# Patient Record
Sex: MALE | Race: WHITE | NOT HISPANIC OR LATINO | Employment: FULL TIME | URBAN - METROPOLITAN AREA
[De-identification: names, ages, dates, MRNs, and addresses within clinical notes are randomized per-mention and may not be internally consistent; named-entity substitution may affect disease eponyms.]

---

## 2023-11-07 DIAGNOSIS — I10 HYPERTENSION, UNSPECIFIED TYPE: ICD-10-CM

## 2023-11-07 RX ORDER — LISINOPRIL 10 MG/1
10 TABLET ORAL DAILY
COMMUNITY
End: 2023-11-07 | Stop reason: SDUPTHER

## 2023-11-10 RX ORDER — LISINOPRIL 10 MG/1
10 TABLET ORAL DAILY
Qty: 90 TABLET | Refills: 1 | Status: SHIPPED | OUTPATIENT
Start: 2023-11-10 | End: 2024-05-02

## 2024-04-16 ENCOUNTER — OFFICE VISIT (OUTPATIENT)
Dept: PRIMARY CARE | Facility: CLINIC | Age: 59
End: 2024-04-16
Payer: COMMERCIAL

## 2024-04-16 VITALS
WEIGHT: 175 LBS | BODY MASS INDEX: 21.76 KG/M2 | HEART RATE: 71 BPM | HEIGHT: 75 IN | OXYGEN SATURATION: 99 % | RESPIRATION RATE: 20 BRPM | SYSTOLIC BLOOD PRESSURE: 128 MMHG | DIASTOLIC BLOOD PRESSURE: 74 MMHG | TEMPERATURE: 98.8 F

## 2024-04-16 DIAGNOSIS — L63.9 ALOPECIA AREATA: ICD-10-CM

## 2024-04-16 DIAGNOSIS — Z00.00 ROUTINE GENERAL MEDICAL EXAMINATION AT A HEALTH CARE FACILITY: Primary | ICD-10-CM

## 2024-04-16 DIAGNOSIS — Z12.5 SCREENING FOR MALIGNANT NEOPLASM OF PROSTATE: ICD-10-CM

## 2024-04-16 LAB
ALBUMIN SERPL-MCNC: 4.6 G/DL (ref 3.5–5)
ALP BLD-CCNC: 69 U/L (ref 35–125)
ALT SERPL-CCNC: 27 U/L (ref 5–40)
ANION GAP SERPL CALC-SCNC: 12 MMOL/L
AST SERPL-CCNC: 26 U/L (ref 5–40)
BASOPHILS # BLD AUTO: 0.02 X10*3/UL (ref 0–0.1)
BASOPHILS NFR BLD AUTO: 0.4 %
BILIRUB SERPL-MCNC: 0.7 MG/DL (ref 0.1–1.2)
BUN SERPL-MCNC: 19 MG/DL (ref 8–25)
CALCIUM SERPL-MCNC: 9.8 MG/DL (ref 8.5–10.4)
CHLORIDE SERPL-SCNC: 101 MMOL/L (ref 97–107)
CHOLEST SERPL-MCNC: 181 MG/DL (ref 133–200)
CHOLEST/HDLC SERPL: 4.9 {RATIO}
CO2 SERPL-SCNC: 26 MMOL/L (ref 24–31)
CREAT SERPL-MCNC: 0.9 MG/DL (ref 0.4–1.6)
EGFRCR SERPLBLD CKD-EPI 2021: >90 ML/MIN/1.73M*2
EOSINOPHIL # BLD AUTO: 0.04 X10*3/UL (ref 0–0.7)
EOSINOPHIL NFR BLD AUTO: 0.8 %
ERYTHROCYTE [DISTWIDTH] IN BLOOD BY AUTOMATED COUNT: 13 % (ref 11.5–14.5)
ERYTHROCYTE [SEDIMENTATION RATE] IN BLOOD BY WESTERGREN METHOD: 8 MM/H (ref 0–20)
GLUCOSE SERPL-MCNC: 97 MG/DL (ref 65–99)
HCT VFR BLD AUTO: 44.6 % (ref 41–52)
HDLC SERPL-MCNC: 37 MG/DL
HGB BLD-MCNC: 15.3 G/DL (ref 13.5–17.5)
IMM GRANULOCYTES # BLD AUTO: 0.01 X10*3/UL (ref 0–0.7)
IMM GRANULOCYTES NFR BLD AUTO: 0.2 % (ref 0–0.9)
LDLC SERPL CALC-MCNC: 98 MG/DL (ref 65–130)
LYMPHOCYTES # BLD AUTO: 2.08 X10*3/UL (ref 1.2–4.8)
LYMPHOCYTES NFR BLD AUTO: 43 %
MCH RBC QN AUTO: 31.3 PG (ref 26–34)
MCHC RBC AUTO-ENTMCNC: 34.3 G/DL (ref 32–36)
MCV RBC AUTO: 91 FL (ref 80–100)
MONOCYTES # BLD AUTO: 0.41 X10*3/UL (ref 0.1–1)
MONOCYTES NFR BLD AUTO: 8.5 %
NEUTROPHILS # BLD AUTO: 2.28 X10*3/UL (ref 1.2–7.7)
NEUTROPHILS NFR BLD AUTO: 47.1 %
NRBC BLD-RTO: 0 /100 WBCS (ref 0–0)
PLATELET # BLD AUTO: 165 X10*3/UL (ref 150–450)
POTASSIUM SERPL-SCNC: 4.6 MMOL/L (ref 3.4–5.1)
PROT SERPL-MCNC: 7.3 G/DL (ref 5.9–7.9)
PSA SERPL-MCNC: 0.2 NG/ML
RBC # BLD AUTO: 4.89 X10*6/UL (ref 4.5–5.9)
SODIUM SERPL-SCNC: 139 MMOL/L (ref 133–145)
TRIGL SERPL-MCNC: 229 MG/DL (ref 40–150)
TSH SERPL DL<=0.05 MIU/L-ACNC: 1.35 MIU/L (ref 0.27–4.2)
WBC # BLD AUTO: 4.8 X10*3/UL (ref 4.4–11.3)

## 2024-04-16 PROCEDURE — 85652 RBC SED RATE AUTOMATED: CPT | Performed by: FAMILY MEDICINE

## 2024-04-16 PROCEDURE — 84153 ASSAY OF PSA TOTAL: CPT | Performed by: FAMILY MEDICINE

## 2024-04-16 PROCEDURE — 1036F TOBACCO NON-USER: CPT | Performed by: FAMILY MEDICINE

## 2024-04-16 PROCEDURE — 85025 COMPLETE CBC W/AUTO DIFF WBC: CPT | Performed by: FAMILY MEDICINE

## 2024-04-16 PROCEDURE — 36415 COLL VENOUS BLD VENIPUNCTURE: CPT | Performed by: FAMILY MEDICINE

## 2024-04-16 PROCEDURE — 80053 COMPREHEN METABOLIC PANEL: CPT | Performed by: FAMILY MEDICINE

## 2024-04-16 PROCEDURE — 86038 ANTINUCLEAR ANTIBODIES: CPT | Mod: WESLAB | Performed by: FAMILY MEDICINE

## 2024-04-16 PROCEDURE — 84443 ASSAY THYROID STIM HORMONE: CPT | Performed by: FAMILY MEDICINE

## 2024-04-16 PROCEDURE — 80061 LIPID PANEL: CPT | Performed by: FAMILY MEDICINE

## 2024-04-16 PROCEDURE — 99396 PREV VISIT EST AGE 40-64: CPT | Performed by: FAMILY MEDICINE

## 2024-04-16 ASSESSMENT — ENCOUNTER SYMPTOMS
GASTROINTESTINAL NEGATIVE: 1
DEPRESSION: 0
OCCASIONAL FEELINGS OF UNSTEADINESS: 0
LOSS OF SENSATION IN FEET: 0
MUSCULOSKELETAL NEGATIVE: 1
RESPIRATORY NEGATIVE: 1
CARDIOVASCULAR NEGATIVE: 1
NEUROLOGICAL NEGATIVE: 1
CONSTITUTIONAL NEGATIVE: 1

## 2024-04-16 ASSESSMENT — PAIN SCALES - GENERAL: PAINLEVEL: 0-NO PAIN

## 2024-04-16 ASSESSMENT — PATIENT HEALTH QUESTIONNAIRE - PHQ9
2. FEELING DOWN, DEPRESSED OR HOPELESS: NOT AT ALL
1. LITTLE INTEREST OR PLEASURE IN DOING THINGS: NOT AT ALL
SUM OF ALL RESPONSES TO PHQ9 QUESTIONS 1 AND 2: 0

## 2024-04-16 NOTE — PROGRESS NOTES
"Subjective   Patient ID: Mike Hung is a 58 y.o. male who presents for Annual Exam (HERE FOR A PHYSICAL AND FASTING BLOOD WORK. HAS A FEW BALD SPOTS ON HIS HEAD HE IS CONCERNED ABOUT. HE ALSO NEEDS REFILL ON HIS MEDICATION).    HPI 0 coronary calcium score.  Colonoscopy is up to date.     Review of Systems   Constitutional: Negative.    HENT: Negative.     Respiratory: Negative.     Cardiovascular: Negative.    Gastrointestinal: Negative.    Genitourinary: Negative.    Musculoskeletal: Negative.    Neurological: Negative.        Objective   /74 (BP Location: Right arm, Patient Position: Sitting, BP Cuff Size: Adult)   Pulse 71   Temp 37.1 °C (98.8 °F) (Skin)   Resp 20   Ht 1.905 m (6' 3\")   Wt 79.4 kg (175 lb)   SpO2 99%   BMI 21.87 kg/m²     Physical Exam  Vitals and nursing note reviewed.   Constitutional:       General: He is not in acute distress.  HENT:      Right Ear: Tympanic membrane and ear canal normal.      Left Ear: Tympanic membrane and ear canal normal.      Nose: Nose normal. No rhinorrhea.      Mouth/Throat:      Pharynx: Oropharynx is clear. No oropharyngeal exudate or posterior oropharyngeal erythema.      Comments: Dentition wnl  Eyes:      Extraocular Movements: Extraocular movements intact.      Conjunctiva/sclera: Conjunctivae normal.      Pupils: Pupils are equal, round, and reactive to light.   Neck:      Vascular: No carotid bruit.   Cardiovascular:      Rate and Rhythm: Normal rate and regular rhythm.      Heart sounds: Normal heart sounds. No murmur heard.  Pulmonary:      Breath sounds: Normal breath sounds. No wheezing or rhonchi.   Abdominal:      General: Bowel sounds are normal. There is no distension.      Palpations: Abdomen is soft. There is no mass.      Tenderness: There is no abdominal tenderness. There is no guarding or rebound.      Hernia: No hernia is present.   Musculoskeletal:         General: No swelling or tenderness. Normal range of motion.      " Cervical back: Normal range of motion and neck supple.   Lymphadenopathy:      Cervical: No cervical adenopathy.   Skin:     General: Skin is warm.      Findings: No rash.   Neurological:      General: No focal deficit present.      Mental Status: He is alert.         Assessment/Plan   Problem List Items Addressed This Visit    None  Visit Diagnoses         Codes    Routine general medical examination at a health care facility    -  Primary Z00.00    Relevant Orders    CBC and Auto Differential    Comprehensive Metabolic Panel    TSH with reflex to Free T4 if abnormal    Lipid Panel    Screening for malignant neoplasm of prostate     Z12.5    Relevant Orders    Prostate Specific Antigen    Alopecia areata     L63.9    Relevant Orders    PRITI without Reflex ATA    Sedimentation Rate          Call 6 months if not resolving and will set up with derm.

## 2024-04-17 LAB — ANA SER QL HEP2 SUBST: NEGATIVE

## 2024-05-02 DIAGNOSIS — I10 HYPERTENSION, UNSPECIFIED TYPE: Primary | ICD-10-CM

## 2024-05-02 RX ORDER — LISINOPRIL 10 MG/1
10 TABLET ORAL DAILY
Qty: 90 TABLET | Refills: 0 | Status: SHIPPED | OUTPATIENT
Start: 2024-05-02

## 2024-07-31 ENCOUNTER — APPOINTMENT (OUTPATIENT)
Dept: CARDIOLOGY | Facility: HOSPITAL | Age: 59
End: 2024-07-31
Payer: COMMERCIAL

## 2024-07-31 ENCOUNTER — APPOINTMENT (OUTPATIENT)
Dept: RADIOLOGY | Facility: HOSPITAL | Age: 59
End: 2024-07-31
Payer: COMMERCIAL

## 2024-07-31 ENCOUNTER — HOSPITAL ENCOUNTER (OUTPATIENT)
Facility: HOSPITAL | Age: 59
Setting detail: OBSERVATION
LOS: 1 days | Discharge: HOME | End: 2024-08-01
Attending: EMERGENCY MEDICINE | Admitting: STUDENT IN AN ORGANIZED HEALTH CARE EDUCATION/TRAINING PROGRAM
Payer: COMMERCIAL

## 2024-07-31 ENCOUNTER — PHARMACY VISIT (OUTPATIENT)
Dept: PHARMACY | Facility: CLINIC | Age: 59
End: 2024-07-31

## 2024-07-31 DIAGNOSIS — I10 PRIMARY HYPERTENSION: ICD-10-CM

## 2024-07-31 DIAGNOSIS — G45.9 TIA (TRANSIENT ISCHEMIC ATTACK): Primary | ICD-10-CM

## 2024-07-31 PROBLEM — E78.5 HLD (HYPERLIPIDEMIA): Status: ACTIVE | Noted: 2024-07-31

## 2024-07-31 LAB
ALBUMIN SERPL-MCNC: 4.4 G/DL (ref 3.5–5)
ALP BLD-CCNC: 63 U/L (ref 35–125)
ALT SERPL-CCNC: 30 U/L (ref 5–40)
ANION GAP SERPL CALC-SCNC: 9 MMOL/L
APTT PPP: 29.9 SECONDS (ref 22–32.5)
AST SERPL-CCNC: 28 U/L (ref 5–40)
BASOPHILS # BLD AUTO: 0.02 X10*3/UL (ref 0–0.1)
BASOPHILS NFR BLD AUTO: 0.4 %
BILIRUB SERPL-MCNC: 0.4 MG/DL (ref 0.1–1.2)
BUN SERPL-MCNC: 22 MG/DL (ref 8–25)
CALCIUM SERPL-MCNC: 9.4 MG/DL (ref 8.5–10.4)
CHLORIDE SERPL-SCNC: 102 MMOL/L (ref 97–107)
CO2 SERPL-SCNC: 28 MMOL/L (ref 24–31)
CREAT SERPL-MCNC: 1 MG/DL (ref 0.4–1.6)
EGFRCR SERPLBLD CKD-EPI 2021: 87 ML/MIN/1.73M*2
EOSINOPHIL # BLD AUTO: 0.06 X10*3/UL (ref 0–0.7)
EOSINOPHIL NFR BLD AUTO: 1.1 %
ERYTHROCYTE [DISTWIDTH] IN BLOOD BY AUTOMATED COUNT: 12.4 % (ref 11.5–14.5)
GLUCOSE BLD MANUAL STRIP-MCNC: 86 MG/DL (ref 74–99)
GLUCOSE SERPL-MCNC: 105 MG/DL (ref 65–99)
HCT VFR BLD AUTO: 42.3 % (ref 41–52)
HGB BLD-MCNC: 14.9 G/DL (ref 13.5–17.5)
IMM GRANULOCYTES # BLD AUTO: 0.01 X10*3/UL (ref 0–0.7)
IMM GRANULOCYTES NFR BLD AUTO: 0.2 % (ref 0–0.9)
INR PPP: 1.1 (ref 0.9–1.2)
LYMPHOCYTES # BLD AUTO: 2.41 X10*3/UL (ref 1.2–4.8)
LYMPHOCYTES NFR BLD AUTO: 45.6 %
MCH RBC QN AUTO: 31.4 PG (ref 26–34)
MCHC RBC AUTO-ENTMCNC: 35.2 G/DL (ref 32–36)
MCV RBC AUTO: 89 FL (ref 80–100)
MONOCYTES # BLD AUTO: 0.39 X10*3/UL (ref 0.1–1)
MONOCYTES NFR BLD AUTO: 7.4 %
NEUTROPHILS # BLD AUTO: 2.4 X10*3/UL (ref 1.2–7.7)
NEUTROPHILS NFR BLD AUTO: 45.3 %
NRBC BLD-RTO: 0 /100 WBCS (ref 0–0)
PLATELET # BLD AUTO: 153 X10*3/UL (ref 150–450)
POTASSIUM SERPL-SCNC: 4.4 MMOL/L (ref 3.4–5.1)
PROT SERPL-MCNC: 7 G/DL (ref 5.9–7.9)
PROTHROMBIN TIME: 11.2 SECONDS (ref 9.3–12.7)
RBC # BLD AUTO: 4.74 X10*6/UL (ref 4.5–5.9)
SODIUM SERPL-SCNC: 139 MMOL/L (ref 133–145)
TROPONIN T SERPL-MCNC: 8 NG/L
WBC # BLD AUTO: 5.3 X10*3/UL (ref 4.4–11.3)

## 2024-07-31 PROCEDURE — RXMED WILLOW AMBULATORY MEDICATION CHARGE

## 2024-07-31 PROCEDURE — G0378 HOSPITAL OBSERVATION PER HR: HCPCS

## 2024-07-31 PROCEDURE — 99285 EMERGENCY DEPT VISIT HI MDM: CPT | Mod: 25

## 2024-07-31 PROCEDURE — 70450 CT HEAD/BRAIN W/O DYE: CPT | Performed by: RADIOLOGY

## 2024-07-31 PROCEDURE — 70551 MRI BRAIN STEM W/O DYE: CPT

## 2024-07-31 PROCEDURE — 85730 THROMBOPLASTIN TIME PARTIAL: CPT | Performed by: EMERGENCY MEDICINE

## 2024-07-31 PROCEDURE — 2500000001 HC RX 250 WO HCPCS SELF ADMINISTERED DRUGS (ALT 637 FOR MEDICARE OP): Performed by: STUDENT IN AN ORGANIZED HEALTH CARE EDUCATION/TRAINING PROGRAM

## 2024-07-31 PROCEDURE — 70450 CT HEAD/BRAIN W/O DYE: CPT

## 2024-07-31 PROCEDURE — 80053 COMPREHEN METABOLIC PANEL: CPT | Performed by: EMERGENCY MEDICINE

## 2024-07-31 PROCEDURE — 82947 ASSAY GLUCOSE BLOOD QUANT: CPT

## 2024-07-31 PROCEDURE — 93010 ELECTROCARDIOGRAM REPORT: CPT | Performed by: INTERNAL MEDICINE

## 2024-07-31 PROCEDURE — 85025 COMPLETE CBC W/AUTO DIFF WBC: CPT | Performed by: EMERGENCY MEDICINE

## 2024-07-31 PROCEDURE — 70551 MRI BRAIN STEM W/O DYE: CPT | Performed by: RADIOLOGY

## 2024-07-31 PROCEDURE — 85610 PROTHROMBIN TIME: CPT | Performed by: EMERGENCY MEDICINE

## 2024-07-31 PROCEDURE — 93005 ELECTROCARDIOGRAM TRACING: CPT

## 2024-07-31 PROCEDURE — 70544 MR ANGIOGRAPHY HEAD W/O DYE: CPT

## 2024-07-31 PROCEDURE — 84484 ASSAY OF TROPONIN QUANT: CPT | Performed by: EMERGENCY MEDICINE

## 2024-07-31 PROCEDURE — 36415 COLL VENOUS BLD VENIPUNCTURE: CPT | Performed by: EMERGENCY MEDICINE

## 2024-07-31 PROCEDURE — 2500000001 HC RX 250 WO HCPCS SELF ADMINISTERED DRUGS (ALT 637 FOR MEDICARE OP): Performed by: INTERNAL MEDICINE

## 2024-07-31 PROCEDURE — 94760 N-INVAS EAR/PLS OXIMETRY 1: CPT

## 2024-07-31 RX ORDER — LISINOPRIL 20 MG/1
20 TABLET ORAL ONCE
Status: DISCONTINUED | OUTPATIENT
Start: 2024-07-31 | End: 2024-07-31

## 2024-07-31 RX ORDER — LISINOPRIL 10 MG/1
10 TABLET ORAL ONCE
Status: COMPLETED | OUTPATIENT
Start: 2024-07-31 | End: 2024-07-31

## 2024-07-31 RX ORDER — POLYETHYLENE GLYCOL 3350 17 G/17G
17 POWDER, FOR SOLUTION ORAL DAILY
Status: DISCONTINUED | OUTPATIENT
Start: 2024-07-31 | End: 2024-08-01 | Stop reason: HOSPADM

## 2024-07-31 RX ORDER — LISINOPRIL 20 MG/1
20 TABLET ORAL DAILY
Status: DISCONTINUED | OUTPATIENT
Start: 2024-08-01 | End: 2024-08-01 | Stop reason: HOSPADM

## 2024-07-31 RX ORDER — LISINOPRIL 10 MG/1
10 TABLET ORAL DAILY
Status: DISCONTINUED | OUTPATIENT
Start: 2024-07-31 | End: 2024-07-31

## 2024-07-31 RX ORDER — LISINOPRIL 20 MG/1
20 TABLET ORAL DAILY
Qty: 30 TABLET | Refills: 1 | Status: SHIPPED | OUTPATIENT
Start: 2024-08-01 | End: 2024-08-31

## 2024-07-31 SDOH — SOCIAL STABILITY: SOCIAL INSECURITY: HAS ANYONE EVER THREATENED TO HURT YOUR FAMILY OR YOUR PETS?: NO

## 2024-07-31 SDOH — SOCIAL STABILITY: SOCIAL INSECURITY: DO YOU FEEL ANYONE HAS EXPLOITED OR TAKEN ADVANTAGE OF YOU FINANCIALLY OR OF YOUR PERSONAL PROPERTY?: NO

## 2024-07-31 SDOH — SOCIAL STABILITY: SOCIAL INSECURITY: ABUSE: ADULT

## 2024-07-31 SDOH — SOCIAL STABILITY: SOCIAL INSECURITY: WERE YOU ABLE TO COMPLETE ALL THE BEHAVIORAL HEALTH SCREENINGS?: YES

## 2024-07-31 SDOH — SOCIAL STABILITY: SOCIAL INSECURITY: DOES ANYONE TRY TO KEEP YOU FROM HAVING/CONTACTING OTHER FRIENDS OR DOING THINGS OUTSIDE YOUR HOME?: NO

## 2024-07-31 SDOH — SOCIAL STABILITY: SOCIAL INSECURITY: ARE THERE ANY APPARENT SIGNS OF INJURIES/BEHAVIORS THAT COULD BE RELATED TO ABUSE/NEGLECT?: NO

## 2024-07-31 SDOH — SOCIAL STABILITY: SOCIAL INSECURITY: HAVE YOU HAD ANY THOUGHTS OF HARMING ANYONE ELSE?: NO

## 2024-07-31 SDOH — SOCIAL STABILITY: SOCIAL INSECURITY: DO YOU FEEL UNSAFE GOING BACK TO THE PLACE WHERE YOU ARE LIVING?: NO

## 2024-07-31 SDOH — SOCIAL STABILITY: SOCIAL INSECURITY: HAVE YOU HAD THOUGHTS OF HARMING ANYONE ELSE?: NO

## 2024-07-31 SDOH — SOCIAL STABILITY: SOCIAL INSECURITY: ARE YOU OR HAVE YOU BEEN THREATENED OR ABUSED PHYSICALLY, EMOTIONALLY, OR SEXUALLY BY ANYONE?: NO

## 2024-07-31 ASSESSMENT — ACTIVITIES OF DAILY LIVING (ADL)
ADEQUATE_TO_COMPLETE_ADL: YES
JUDGMENT_ADEQUATE_SAFELY_COMPLETE_DAILY_ACTIVITIES: YES
FEEDING YOURSELF: INDEPENDENT
HEARING - LEFT EAR: FUNCTIONAL
TOILETING: INDEPENDENT
DRESSING YOURSELF: INDEPENDENT
BATHING: INDEPENDENT
HEARING - RIGHT EAR: FUNCTIONAL
WALKS IN HOME: INDEPENDENT
GROOMING: INDEPENDENT
LACK_OF_TRANSPORTATION: NO
PATIENT'S MEMORY ADEQUATE TO SAFELY COMPLETE DAILY ACTIVITIES?: YES

## 2024-07-31 ASSESSMENT — COGNITIVE AND FUNCTIONAL STATUS - GENERAL
PATIENT BASELINE BEDBOUND: NO
MOBILITY SCORE: 24
DAILY ACTIVITIY SCORE: 24

## 2024-07-31 ASSESSMENT — COLUMBIA-SUICIDE SEVERITY RATING SCALE - C-SSRS
6. HAVE YOU EVER DONE ANYTHING, STARTED TO DO ANYTHING, OR PREPARED TO DO ANYTHING TO END YOUR LIFE?: NO
1. IN THE PAST MONTH, HAVE YOU WISHED YOU WERE DEAD OR WISHED YOU COULD GO TO SLEEP AND NOT WAKE UP?: NO
2. HAVE YOU ACTUALLY HAD ANY THOUGHTS OF KILLING YOURSELF?: NO

## 2024-07-31 ASSESSMENT — LIFESTYLE VARIABLES
AUDIT-C TOTAL SCORE: 2
AUDIT-C TOTAL SCORE: 2
HOW OFTEN DO YOU HAVE A DRINK CONTAINING ALCOHOL: 2-4 TIMES A MONTH
HOW MANY STANDARD DRINKS CONTAINING ALCOHOL DO YOU HAVE ON A TYPICAL DAY: 1 OR 2
SKIP TO QUESTIONS 9-10: 1
HOW OFTEN DO YOU HAVE 6 OR MORE DRINKS ON ONE OCCASION: NEVER

## 2024-07-31 ASSESSMENT — PAIN SCALES - GENERAL
PAINLEVEL_OUTOF10: 0 - NO PAIN

## 2024-07-31 ASSESSMENT — PATIENT HEALTH QUESTIONNAIRE - PHQ9
SUM OF ALL RESPONSES TO PHQ9 QUESTIONS 1 & 2: 0
1. LITTLE INTEREST OR PLEASURE IN DOING THINGS: NOT AT ALL
2. FEELING DOWN, DEPRESSED OR HOPELESS: NOT AT ALL

## 2024-07-31 ASSESSMENT — PAIN - FUNCTIONAL ASSESSMENT: PAIN_FUNCTIONAL_ASSESSMENT: 0-10

## 2024-07-31 NOTE — CONSULTS
Inpatient consult to Neurology  Consult performed by: Eliseo Kelley MD  Consult ordered by: Evert Oswald MD      Chief complaint: Transient episode of right leg weakness with right-sided numbness to rule out TIA    History Of Present Illness  iMke Hung is a 59 y.o. male with no significant past medical history except for hypertension and in good health came into the ED after patient felt that his right lower extremity went numb and weak.  The symptoms progressed to his right arm with numbness which lasted approximately 15 minutes.  However, patient googled his symptoms and informed his daughter who brought him to the ED for further evaluation management.  Since admission, patient has been doing well and was admitted to the hospital for further evaluation and management.  Patient denies any dizziness, nausea, vomiting, visual or speech disturbances associated with the episode.     Past Medical History  He has a past medical history of Hypertension, Personal history of other diseases of the respiratory system (10/14/2016), and Sebaceous cyst (02/21/2014).    Surgical History  He has no past surgical history on file.     Social History  He reports that he has never smoked. He has never been exposed to tobacco smoke. He has never used smokeless tobacco. He reports that he does not drink alcohol and does not use drugs.     Family history: No significant family history is noted    Allergies  Patient has no known allergies.    Medications  Medications Prior to Admission   Medication Sig Dispense Refill Last Dose    lisinopril 10 mg tablet TAKE ONE TABLET BY MOUTH EVERY DAY 90 tablet 0        Review of Systems  14 point review of systems was reviewed and negative except as noted above.    Neurological Exam  Physical Exam  Mental Status :  Alert , O x 3  Attention and concentration normal  Remote and recent memory intact    Speech : Clear , fluent  No aphasia or dysarthria noted    CN 2-12 :  Pupils round ,  "regular reacting to light  Fundus could not be assessed  VA intact, EOM intact  Facial sensation intact  No facial asymmetry noted  Hearing acuity intact bilaterally  Tongue midline  Shoulder shrug intact    Motor:  Strength moves all extremities against gravity  No pronator drift  Normal bulk and tone    Sensory:   Intact to light touch    Reflexes : 1+  symmetric with equivocal toes    Coordination : Intact to Finger to Nose    Gait : Unable to assess.      Last Recorded Vitals   Blood pressure 136/89, pulse 72, temperature 36 °C (96.8 °F), temperature source Temporal, resp. rate 17, height 1.905 m (6' 3\"), weight 83.1 kg (183 lb 3.2 oz), SpO2 96%.    Relevant Results  ECG 12 lead    Result Date: 7/31/2024  Normal sinus rhythm Normal ECG No previous ECGs available    MR brain wo IV contrast    Result Date: 7/31/2024  Interpreted By:  Tomy Lo, STUDY: MR BRAIN WO IV CONTRAST; MR ANGIO HEAD WO IV CONTRAST; ;  7/31/2024 8:23 am; 7/31/2024 8:31 am   INDICATION: Signs/Symptoms:TIA; Signs/Symptoms:A.   COMPARISON: CT head from 07/31/2024.   ACCESSION NUMBER(S): [id number]; [id number]   ORDERING CLINICIAN: WOODY PENALOZA   TECHNIQUE: MRI of the brain was performed with the acquisition of axial diffusion-weighted, axial FLAIR, axial T2 fat saturated, axial T2 gradient echo, and coronal and sagittal T1 weighted sequences.   MRA of the head was performed with the acquisition of axial 3D time-of-flight sequence. Segmented MIPs are provided.   FINDINGS: MRI brain:   There is no acute intracranial hemorrhage or infarct. There is no abnormal extra-axial fluid collection.   The ventricles, sulci, and basilar cisterns are normal in size, shape, and configuration. There are few scattered foci of T2 hyperintensity within the bilateral cerebral hemispheres which are nonspecific and of doubtful clinical significance.   Visualized paranasal sinuses and mastoid air cells are essentially clear.   MRA head:   Bilateral internal " carotid arteries are normal in course and caliber. There is no narrowing of the visualized portions of the bilateral anterior or middle cerebral arteries. There is no narrowing of the bilateral intradural vertebral arteries or basilar artery and there is no narrowing of the visualized portions of the bilateral posterior cerebral arteries.   There are no aneurysms.       Essentially unremarkable MRI of the brain and MRA of the head.   This study was interpreted at [facility].   MACRO: None   Signed by: Tomy Lo 7/31/2024 8:41 AM Dictation workstation:   [id number]    MR angio head wo IV contrast    Result Date: 7/31/2024  Interpreted By:  Tomy Lo, STUDY: MR BRAIN WO IV CONTRAST; MR ANGIO HEAD WO IV CONTRAST; ;  7/31/2024 8:23 am; 7/31/2024 8:31 am   INDICATION: Signs/Symptoms:TIA; Signs/Symptoms:A.   COMPARISON: CT head from 07/31/2024.   ACCESSION NUMBER(S): [id number]; [id number]   ORDERING CLINICIAN: WOODY PENALOZA   TECHNIQUE: MRI of the brain was performed with the acquisition of axial diffusion-weighted, axial FLAIR, axial T2 fat saturated, axial T2 gradient echo, and coronal and sagittal T1 weighted sequences.   MRA of the head was performed with the acquisition of axial 3D time-of-flight sequence. Segmented MIPs are provided.   FINDINGS: MRI brain:   There is no acute intracranial hemorrhage or infarct. There is no abnormal extra-axial fluid collection.   The ventricles, sulci, and basilar cisterns are normal in size, shape, and configuration. There are few scattered foci of T2 hyperintensity within the bilateral cerebral hemispheres which are nonspecific and of doubtful clinical significance.   Visualized paranasal sinuses and mastoid air cells are essentially clear.   MRA head:   Bilateral internal carotid arteries are normal in course and caliber. There is no narrowing of the visualized portions of the bilateral anterior or middle cerebral arteries. There is  no narrowing of the bilateral intradural vertebral arteries or basilar artery and there is no narrowing of the visualized portions of the bilateral posterior cerebral arteries.   There are no aneurysms.       Essentially unremarkable MRI of the brain and MRA of the head.   This study was interpreted at [facility].   MACRO: None   Signed by: Tomy Lo 7/31/2024 8:41 AM Dictation workstation:   [id number]    CT head wo IV contrast    Result Date: 7/31/2024  Interpreted By:  Álvaro Mortensen, STUDY: CT HEAD WO IV CONTRAST;  7/31/2024 3:58 am   INDICATION: Signs/Symptoms:RLE weakness, R sided numbness.   COMPARISON: None   ACCESSION NUMBER(S): [id number]   ORDERING CLINICIAN: ETHAN ARENAS   TECHNIQUE: Contiguous axial images of the head were obtained without intravenous contrast.   FINDINGS: BRAIN PARENCHYMA:   The gray white matter differentiation is preserved.  No mass effect or midline shift.   HEMORRHAGE:  No evidence of acute intracranial hemorrhage. VENTRICLES AND EXTRA-AXIAL SPACES:  The ventricles are within normal limits in size for brain volume.  No evidence of abnormal extraaxial fluid collection. EXTRACRANIAL SOFT TISSUES:  Within normal limits. PARANASAL SINUSES/MASTOIDS:  The visualized paranasal sinuses and mastoid air cells are clear and well pneumatized. CALVARIUM:  No evidence of depressed calvarial fracture.   OTHER FINDINGS:  None       No evidence of acute intracranial hemorrhage or mass effect.   If there is persistent concern for acute intracranial process, MRI may be obtained for further evaluation as clinically indicated.   MACRO: None   Signed by: Álvaro Mortensen 7/31/2024 4:05 AM Dictation workstation:   [id number]     Results for orders placed or performed during the hospital encounter of 07/31/24 (from the past 24 hour(s))   POCT GLUCOSE   Result Value Ref Range    POCT Glucose 86 74 - 99 mg/dL   CBC and Auto Differential   Result Value Ref Range    WBC  5.3 4.4 - 11.3 x10*3/uL    nRBC 0.0 0.0 - 0.0 /100 WBCs    RBC 4.74 4.50 - 5.90 x10*6/uL    Hemoglobin 14.9 13.5 - 17.5 g/dL    Hematocrit 42.3 41.0 - 52.0 %    MCV 89 80 - 100 fL    MCH 31.4 26.0 - 34.0 pg    MCHC 35.2 32.0 - 36.0 g/dL    RDW 12.4 11.5 - 14.5 %    Platelets 153 150 - 450 x10*3/uL    Neutrophils % 45.3 40.0 - 80.0 %    Immature Granulocytes %, Automated 0.2 0.0 - 0.9 %    Lymphocytes % 45.6 13.0 - 44.0 %    Monocytes % 7.4 2.0 - 10.0 %    Eosinophils % 1.1 0.0 - 6.0 %    Basophils % 0.4 0.0 - 2.0 %    Neutrophils Absolute 2.40 1.20 - 7.70 x10*3/uL    Immature Granulocytes Absolute, Automated 0.01 0.00 - 0.70 x10*3/uL    Lymphocytes Absolute 2.41 1.20 - 4.80 x10*3/uL    Monocytes Absolute 0.39 0.10 - 1.00 x10*3/uL    Eosinophils Absolute 0.06 0.00 - 0.70 x10*3/uL    Basophils Absolute 0.02 0.00 - 0.10 x10*3/uL   Comprehensive metabolic panel   Result Value Ref Range    Glucose 105 (H) 65 - 99 mg/dL    Sodium 139 133 - 145 mmol/L    Potassium 4.4 3.4 - 5.1 mmol/L    Chloride 102 97 - 107 mmol/L    Bicarbonate 28 24 - 31 mmol/L    Urea Nitrogen 22 8 - 25 mg/dL    Creatinine 1.00 0.40 - 1.60 mg/dL    eGFR 87 >60 mL/min/1.73m*2    Calcium 9.4 8.5 - 10.4 mg/dL    Albumin 4.4 3.5 - 5.0 g/dL    Alkaline Phosphatase 63 35 - 125 U/L    Total Protein 7.0 5.9 - 7.9 g/dL    AST 28 5 - 40 U/L    Bilirubin, Total 0.4 0.1 - 1.2 mg/dL    ALT 30 5 - 40 U/L    Anion Gap 9 <=19 mmol/L   Troponin T, High Sensitivity   Result Value Ref Range    Troponin T, High Sensitivity 8 <=14 ng/L   Protime-INR   Result Value Ref Range    Protime 11.2 9.3 - 12.7 seconds    INR 1.1 0.9 - 1.2   APTT   Result Value Ref Range    aPTT 29.9 22.0 - 32.5 seconds   ECG 12 lead   Result Value Ref Range    Ventricular Rate 61 BPM    Atrial Rate 61 BPM    ID Interval 168 ms    QRS Duration 94 ms    QT Interval 400 ms    QTC Calculation(Bazett) 402 ms    P Axis 30 degrees    R Axis -19 degrees    T Axis 22 degrees    QRS Count 10 beats    Q Onset  210 ms    P Onset 126 ms    P Offset 176 ms    T Offset 410 ms    QTC Fredericia 402 ms          Assessment/Plan     Active Problems:    Hypertension    HLD (hyperlipidemia)    Mike Hung is a 59 y.o. male with no significant past medical history except for hypertension and in good health came into the ED after patient felt that his right lower extremity went numb and weak and with right upper extremity numbness with symptoms resolved with MRI of the brain negative for any acute CVA, TIA would be in the differential given his risk factor, stable    Recommendations;  Reviewed MRI Brain and CT scan brain and  MR angiogram head /neck results.  Start Aspirin  and High intensity statin  2 d echo pending  PT/OT eval and treatment  Neurochecks  Stroke labs including TSH, Hb A1c, Lipid Panel  Permissive Blood pressure as per stroke protocol  Blood pressure / Blood sugar monitoring and control  Telemetry monitoring   Continue current management  Discussed the above plan with the patient / family / attending physician

## 2024-07-31 NOTE — CARE PLAN
The patient's goals for the shift include  safety and rest    The clinical goals for the shift include monitor stroke symptoms, safety, and promote rest      07/31/24 at 7:28 PM - Kailey Feliz RN

## 2024-07-31 NOTE — PROGRESS NOTES
Physical Therapy                 Therapy Communication Note    Patient Name: Mike Hung  MRN: [id number]  Today's Date: 7/31/2024     Discipline: Physical Therapy    Missed Visit Reason:      Missed Time: Cancel    Comment: Spoke w/ pt, he reports that his symptoms have resolved, feels back to baseline and has been able to get up and walk w/o difficulty. Pt denied need for PT services at this time. Will discontinue PT services.

## 2024-07-31 NOTE — DISCHARGE SUMMARY
Discharge Diagnosis  TIA (transient ischemic attack)    Issues Requiring Follow-Up  Primary care follow-up    Discharge Meds     Your medication list        CHANGE how you take these medications        Instructions Last Dose Given Next Dose Due   lisinopril 20 mg tablet  Start taking on: August 1, 2024  What changed:   medication strength  how much to take      Take 1 tablet (20 mg) by mouth once daily. Do not fill before August 1, 2024.                 Where to Get Your Medications        These medications were sent to [facility]  [address], [address], [place] [facility] [address]      Hours: 9 AM to 6 PM Mon-Fri, 9 AM to 1 PM Sat Phone: [phone]   lisinopril 20 mg tablet         Test Results Pending At Discharge  Pending Labs       No current pending labs.            Hospital Course   Patient mid to the hospital with right-sided weakness and tingling.  Patient symptoms improved when he got to the emergency room or shortly prior to.  In any case CT scan was done which was unremarkable, stroke consult was placed due to patient's NIH score with symptoms prior to ED.  Patient was admitted for further workup for TIA.  MRI/MRA were complete and unremarkable.  Neurology consult pending at this time.  Anticipate clearance from neurology.  Patient did not want to stay for his 2D echocardiogram and I have placed an order for this to be done as an outpatient.  Patient's blood pressure was elevated on admission to ED and has been elevated throughout the day and from history may be somewhat not ideally controlled at home.  I did increase his lisinopril to 20 mg in hospital.  Patient was discharged home with DASH diet instructions and heart healthy diet, and information on controlling blood pressure through lifestyle.    Patient be discharged home in stable condition and improved condition.      Pertinent Physical Exam At Time of Discharge  Physical Exam  Generally no acute distress  HEENT PERRL  EOMI  Cardiovascular [place] S2 regular rhythm  Lungs clear to auscultation bilaterally  Abdomen bowel sounds present  Extremities no clubbing cyanosis edema  Neuro alert and oriented x 3, cranial nerves II through XII intact, motor and sensation intact in both upper lower extremity  Outpatient Follow-Up  No future appointments.  Total time spent on discharge 45 minutes    Richard Ang MD

## 2024-07-31 NOTE — H&P
History Of Present Illness  Mike Hung is a 59 y.o. male with no significant past medical history except for continue home meds controlled with lisinopril presented to the ED complaining of right upper and lower extremity weakness after he woke up sometime around 2:30 in the morning.  He reports this is knee being weak and numbness and tingling in his right shoulder.  He did report he had difficulty walking, he took over-the-counter aspirin unknown dose, and presented to the ED.  Upon interviewing the patient around 5:30 AM, he reports 90% symptom improvement.  He continues to complain of some knee pain with clicking which she has been meaning to get outpatient workup.  He did report reproducing pain when he touched his right shoulder and pressed it with his left hand.  Patient is a lifetime non-smoker, denies history of stroke or seizure, facial droop, dysarthria, denies loss of bowel or bladder function, tongue biting, headache, unilateral vision loss, diplopia, headache, chest pain, palpitations, shortness of breath, or abdominal pain.    In the ED on admission NIH stroke scale 0, vitals notable for /104, has not taken his a.m. meds, labs unremarkable, CT imaging of head unremarkable.    Patient was admitted to observation for possible TIA stroke rule out    Past Medical History  Past Medical History:   Diagnosis Date    Hypertension     Personal history of other diseases of the respiratory system 10/14/2016    History of acute sinusitis    Sebaceous cyst 02/21/2014    Infected sebaceous cyst       Surgical History  History reviewed. No pertinent surgical history.     Social History  He reports that he has never smoked. He has never been exposed to tobacco smoke. He has never used smokeless tobacco. He reports that he does not drink alcohol and does not use drugs.    Family History  No family history on file.     Allergies  Patient has no known allergies.    Review of Systems  Denies history of stroke  or seizure, facial droop, dysarthria, denies loss of bowel or bladder function, tongue biting, headache, unilateral vision loss, diplopia, headache, chest pain, palpitations, shortness of breath, or abdominal pain.  Physical Exam  Constitutional:       General: He is not in acute distress.     Appearance: Normal appearance. He is normal weight. He is not ill-appearing or toxic-appearing.   HENT:      Head: Normocephalic and atraumatic.      Mouth/Throat:      Mouth: Mucous membranes are moist.   Eyes:      Extraocular Movements: Extraocular movements intact.      Pupils: Pupils are equal, round, and reactive to light.   Cardiovascular:      Rate and Rhythm: Normal rate and regular rhythm.      Heart sounds: No murmur heard.     No gallop.   Pulmonary:      Effort: Pulmonary effort is normal. No respiratory distress.      Breath sounds: Normal breath sounds. No wheezing or rales.   Abdominal:      General: Abdomen is flat. There is no distension.      Palpations: Abdomen is soft.      Tenderness: There is no abdominal tenderness. There is no guarding.   Musculoskeletal:         General: No swelling, tenderness, deformity or signs of injury. Normal range of motion.      Cervical back: No rigidity.      Right lower leg: No edema.      Left lower leg: No edema.   Skin:     General: Skin is warm and dry.      Coloration: Skin is not jaundiced.      Findings: No bruising.   Neurological:      General: No focal deficit present.      Mental Status: He is alert and oriented to person, place, and time.      Cranial Nerves: No cranial nerve deficit.      Sensory: No sensory deficit.      Motor: No weakness.      Coordination: Coordination normal.      Gait: Gait normal.      Comments: NIH stroke scale score 0   Psychiatric:         Mood and Affect: Mood normal.         Behavior: Behavior normal.         Thought Content: Thought content normal.         Judgment: Judgment normal.          Last Recorded Vitals  Blood pressure  "152/90, pulse 62, temperature 36.9 °C (98.4 °F), temperature source Temporal, resp. rate 16, height 1.905 m (6' 3\"), weight 81.6 kg (179 lb 14.3 oz), SpO2 98%.    Relevant Results      Results for orders placed or performed during the hospital encounter of 07/31/24 (from the past 24 hour(s))   POCT GLUCOSE   Result Value Ref Range    POCT Glucose 86 74 - 99 mg/dL   CBC and Auto Differential   Result Value Ref Range    WBC 5.3 4.4 - 11.3 x10*3/uL    nRBC 0.0 0.0 - 0.0 /100 WBCs    RBC 4.74 4.50 - 5.90 x10*6/uL    Hemoglobin 14.9 13.5 - 17.5 g/dL    Hematocrit 42.3 41.0 - 52.0 %    MCV 89 80 - 100 fL    MCH 31.4 26.0 - 34.0 pg    MCHC 35.2 32.0 - 36.0 g/dL    RDW 12.4 11.5 - 14.5 %    Platelets 153 150 - 450 x10*3/uL    Neutrophils % 45.3 40.0 - 80.0 %    Immature Granulocytes %, Automated 0.2 0.0 - 0.9 %    Lymphocytes % 45.6 13.0 - 44.0 %    Monocytes % 7.4 2.0 - 10.0 %    Eosinophils % 1.1 0.0 - 6.0 %    Basophils % 0.4 0.0 - 2.0 %    Neutrophils Absolute 2.40 1.20 - 7.70 x10*3/uL    Immature Granulocytes Absolute, Automated 0.01 0.00 - 0.70 x10*3/uL    Lymphocytes Absolute 2.41 1.20 - 4.80 x10*3/uL    Monocytes Absolute 0.39 0.10 - 1.00 x10*3/uL    Eosinophils Absolute 0.06 0.00 - 0.70 x10*3/uL    Basophils Absolute 0.02 0.00 - 0.10 x10*3/uL   Comprehensive metabolic panel   Result Value Ref Range    Glucose 105 (H) 65 - 99 mg/dL    Sodium 139 133 - 145 mmol/L    Potassium 4.4 3.4 - 5.1 mmol/L    Chloride 102 97 - 107 mmol/L    Bicarbonate 28 24 - 31 mmol/L    Urea Nitrogen 22 8 - 25 mg/dL    Creatinine 1.00 0.40 - 1.60 mg/dL    eGFR 87 >60 mL/min/1.73m*2    Calcium 9.4 8.5 - 10.4 mg/dL    Albumin 4.4 3.5 - 5.0 g/dL    Alkaline Phosphatase 63 35 - 125 U/L    Total Protein 7.0 5.9 - 7.9 g/dL    AST 28 5 - 40 U/L    Bilirubin, Total 0.4 0.1 - 1.2 mg/dL    ALT 30 5 - 40 U/L    Anion Gap 9 <=19 mmol/L   Troponin T, High Sensitivity   Result Value Ref Range    Troponin T, High Sensitivity 8 <=14 ng/L   Protime-INR "   Result Value Ref Range    Protime 11.2 9.3 - 12.7 seconds    INR 1.1 0.9 - 1.2   APTT   Result Value Ref Range    aPTT 29.9 22.0 - 32.5 seconds     CT head wo IV contrast    Result Date: 7/31/2024  Interpreted By:  Álvaro Mortensen, STUDY: CT HEAD WO IV CONTRAST;  7/31/2024 3:58 am   INDICATION: Signs/Symptoms:RLE weakness, R sided numbness.   COMPARISON: None   ACCESSION NUMBER(S): [id number]   ORDERING CLINICIAN: ETHAN ARENAS   TECHNIQUE: Contiguous axial images of the head were obtained without intravenous contrast.   FINDINGS: BRAIN PARENCHYMA:   The gray white matter differentiation is preserved.  No mass effect or midline shift.   HEMORRHAGE:  No evidence of acute intracranial hemorrhage. VENTRICLES AND EXTRA-AXIAL SPACES:  The ventricles are within normal limits in size for brain volume.  No evidence of abnormal extraaxial fluid collection. EXTRACRANIAL SOFT TISSUES:  Within normal limits. PARANASAL SINUSES/MASTOIDS:  The visualized paranasal sinuses and mastoid air cells are clear and well pneumatized. CALVARIUM:  No evidence of depressed calvarial fracture.   OTHER FINDINGS:  None       No evidence of acute intracranial hemorrhage or mass effect.   If there is persistent concern for acute intracranial process, MRI may be obtained for further evaluation as clinically indicated.   MACRO: None   Signed by: Álvaro Mortensen 7/31/2024 4:05 AM Dictation workstation:   [id number]        Assessment/Plan   Principal Problem:    TIA (transient ischemic attack)  Active Problems:    Hypertension    HLD (hyperlipidemia)    Transient ischemic attack  CT head nonacute  Recent lipid profile with slight hypertriglyceridemia  Troponins 8  Neurochecks  Fall precaution  PT OT consulted  MRI, MRA ordered and pending  Neurology consult    Hypertension:  Continue lisinopril    DVT prophylaxis; low risk  GI prophylaxis: PPI    CODE STATUS full code    I spent 45 minutes in the professional and overall care of this patient.    Evert  MD Darek

## 2024-07-31 NOTE — PROGRESS NOTES
07/31/24 1021   Discharge Planning   Living Arrangements Spouse/significant other   Support Systems Spouse/significant other   Assistance Needed independent   Type of Residence Private residence   Do you have animals or pets at home? No   Who is requesting discharge planning? Provider   Home or Post Acute Services None   Expected Discharge Disposition Home   Does the patient need discharge transport arranged? No     ADOD:  today   Home no needs

## 2024-07-31 NOTE — PROGRESS NOTES
Spiritual Care Visit    Clinical Encounter Type  Visited With: Patient  Routine Visit: Introduction  Continue Visiting: Yes         Values/Beliefs  Spiritual Requests During Hospitalization: Anointing & Communion today    Sacramental Encounters  Communion: Patient wants communion  Communion Given Indicator: Yes  Sacrament of Sick-Anointing: Anointed     Harsh Valentin

## 2024-07-31 NOTE — CARE PLAN
The patient's goals for the shift include  safety     The clinical goals for the shift include MRI brain, stroke role out      07/31/24 at 6:37 AM - Kailey Feliz RN

## 2024-07-31 NOTE — ED PROVIDER NOTES
EMERGENCY DEPARTMENT ENCOUNTER      Pt Name: Mike Hung  MRN: [id number]  Birthdate 1965  Date of evaluation: 7/31/2024  ED Provider: Juany Lopez DO     CHIEF COMPLAINT       Chief Complaint   Patient presents with    Extremity Weakness    Numbness     Pt woke up this morning to use the restroom and found that his right side was weak. Pt was unable to walk due to weakness in right leg. Pt regained some strength at home. Pt upper extremity strength is equal, but pt right leg is weaker than his left.       HISTORY OF PRESENT ILLNESS    Mike Hung is a 59 y.o. who presents to the emergency department via private vehicle with complaints of right lower extremity numbness and weakness as well as numbness in her right upper extremity.  He went to bed around 11 PM.  He woke up at approximately 230 and felt his right side was weak.  He did not really feel any speech difficulties and he looked in the mirror and did not notice any facial droop.  He does however state he he had difficulty walking and had to hold onto things.  He felt as though his leg was going to give out and he had difficulty placing weight.  Symptoms began to spontaneously subside.  He has never had anything like this previously.  He has no lightheadedness or dizziness.  No other acute complaints.    REVIEW OF SYSTEMS     Focused ROS performed and negative other than as listed in HPI    PAST MEDICAL HISTORY     Past Medical History:   Diagnosis Date    Hypertension     Personal history of other diseases of the respiratory system 10/14/2016    History of acute sinusitis    Sebaceous cyst 02/21/2014    Infected sebaceous cyst       SURGICAL HISTORY     History reviewed. No pertinent surgical history.    CURRENT MEDICATIONS       Previous Medications    LISINOPRIL 10 MG TABLET    TAKE ONE TABLET BY MOUTH EVERY DAY       ALLERGIES     Patient has no known allergies.    FAMILY HISTORY     No family history on file.     SOCIAL HISTORY       Social  History     Socioeconomic History    Marital status:    Tobacco Use    Smoking status: Never     Passive exposure: Never    Smokeless tobacco: Never   Vaping Use    Vaping status: Never Used   Substance and Sexual Activity    Alcohol use: Never    Drug use: Never    Sexual activity: Defer       PHYSICAL EXAM       ED Triage Vitals [07/31/24 0305]   Temperature Heart Rate Respirations BP   36.9 °C (98.4 °F) 63 16 (!) 175/104      Pulse Ox Temp Source Heart Rate Source Patient Position   99 % Temporal Monitor Sitting      BP Location FiO2 (%)     Right arm --        General: Appears well, no acute distress, alert  Head: Head atraumatic; normocephalic  Eyes: normal inspection; no icterus  ENT: mucosa moist without lesion  Neck: Normal inspection, no meningeal signs  Resp: Normal breath sounds, no wheeze or crackles; No respiratory distress  Chest Wall: no tenderness or deformity  Heart: Heart rate and rhythm regular; No Murmurs  Abdomen: Soft, Non-tender; No distention, guarding, rigidity, or rebound  MSK: Normal appearance; Moves all extremities; No Pedal edema  Neuro: AxOx3; CNII-XII intact; sensation intact to upper and lower extremities bilaterally; motor intact to upper and lower extremities bilaterally; finger-nose test normal; no nystagmus; normal gait; speech normal, no dysphasia or aphasia  Psych: Mood and Affect normal  Skin: Color appropriate; warm; Dry    DIAGNOSTIC RESULTS   Lab and radiology results are independently interpreted unless noted below.  RADIOLOGY (Per Emergency Physician):     Interpretation per the Radiologist below, if available at the time of this note:  CT head wo IV contrast   Final Result   No evidence of acute intracranial hemorrhage or mass effect.        If there is persistent concern for acute intracranial process, MRI   may be obtained for further evaluation as clinically indicated.        MACRO:   None        Signed by: Álvaro Mortensen 7/31/2024 4:05 AM   Dictation  workstation:   [id number]          LABS:  Abnormal Labs Reviewed   COMPREHENSIVE METABOLIC PANEL - Abnormal; Notable for the following components:       Result Value    Glucose 105 (*)     All other components within normal limits       All other labs were within normal range or not returned as of this dictation.    EKG:  Personally interpreted by Juany Lopez DO  0334: Normal sinus rhythm with ventricular rate 61 normal axis and intervals no acute ischemic changes    EMERGENCY DEPARTMENT COURSE/MDM   Patient presents with concern for strokelike symptoms.  Symptoms had spontaneously subsided by the time he arrived to the emergency department.  NIH is currently 0.  Stroke workup is initiated.  He is agreeable with this plan of care.    NIH Stroke Scale: 0      ED Course as of 07/31/24 0512   Wed Jul 31, 2024   0419 Lab work and imaging returned showing no acute abnormalities.  However given the clinical concern for possible CVA patient is recommended for admission.  He is updated on these findings and is agreeable with plan of admission. [EF]      ED Course User Index  [EF] Juany Lopez DO         Diagnoses as of 07/31/24 0512   TIA (transient ischemic attack)       Meds Administered:  Medications - No data to display    PROCEDURES   Unless otherwise noted below, none  Procedures      FINAL IMPRESSION      1. TIA (transient ischemic attack)          DISPOSITION    Admit 07/31/2024 04:31:09 AM        (Comment: Please note this report has been produced using speech recognition software and may contain errors related to that system including errors in grammar, punctuation, and spelling, as well as words and phrases that may be inappropriate.  If there are any questions or concerns please feel free to contact the dictating provider for clarification.)    Juany Lopez DO (electronically signed)  Emergency Medicine Physician    History Limited by: None  Independent history obtained from: None  External records  reviewed: None  Diagnostics interpreted by me: EKG - see my independent interpretation elsewhere in the chart  Discussions with other clinicians: Hospitalist/Admitting Team    Chronic conditions impacting care: Hypertension  Social determinants of health affecting care: None  Diagnostic tests considered but not performed: n/a  ED Medications managed:  Medications - No data to display    Prescription drugs considered: None             Juany Lopez DO  07/31/24 0513

## 2024-07-31 NOTE — PROGRESS NOTES
Occupational Therapy                 Therapy Communication Note    Patient Name: Mike Hung  MRN: [id number]  Today's Date: 7/31/2024     Discipline: Occupational Therapy    Missed Visit Reason: Missed Visit Reason: Other (Comment) (Patient screened)    Comment: Patient is a 59 year old male admitted with TIA. Upon arrival, patient reports that all his symptoms have resolved. Patient denies pain, denies numbness/tingling. He is able to complete functional transfers, functional mobility, and ADLs with [place]. Patient reports no concerns with returning to PLOF. OT orders to be discontinued at this time.

## 2024-07-31 NOTE — CARE PLAN
Problem: Fall/Injury  Goal: Not fall by end of shift  Outcome: Progressing  Goal: Be free from injury by end of the shift  Outcome: Progressing  Goal: Verbalize understanding of personal risk factors for fall in the hospital  Outcome: Progressing  Goal: Verbalize understanding of risk factor reduction measures to prevent injury from fall in the home  Outcome: Progressing     Problem: Fall/Injury  Goal: Not fall by end of shift  Outcome: Progressing  Goal: Be free from injury by end of the shift  Outcome: Progressing  Goal: Verbalize understanding of personal risk factors for fall in the hospital  Outcome: Progressing  Goal: Verbalize understanding of risk factor reduction measures to prevent injury from fall in the home  Outcome: Progressing   The patient's goals for the shift include      The clinical goals for the shift include MRI brain, stroke role out

## 2024-08-01 ENCOUNTER — APPOINTMENT (OUTPATIENT)
Dept: CARDIOLOGY | Facility: HOSPITAL | Age: 59
End: 2024-08-01
Payer: COMMERCIAL

## 2024-08-01 VITALS
DIASTOLIC BLOOD PRESSURE: 93 MMHG | HEIGHT: 75 IN | HEART RATE: 92 BPM | RESPIRATION RATE: 16 BRPM | BODY MASS INDEX: 22.78 KG/M2 | WEIGHT: 183.2 LBS | TEMPERATURE: 96.1 F | OXYGEN SATURATION: 98 % | SYSTOLIC BLOOD PRESSURE: 141 MMHG

## 2024-08-01 LAB
ANION GAP SERPL CALC-SCNC: 8 MMOL/L
AORTIC VALVE PEAK VELOCITY: 1.3 M/S
AV PEAK GRADIENT: 6.8 MMHG
AVA (PEAK VEL): 2.69 CM2
BUN SERPL-MCNC: 18 MG/DL (ref 8–25)
CALCIUM SERPL-MCNC: 9.1 MG/DL (ref 8.5–10.4)
CHLORIDE SERPL-SCNC: 105 MMOL/L (ref 97–107)
CO2 SERPL-SCNC: 25 MMOL/L (ref 24–31)
CREAT SERPL-MCNC: 1 MG/DL (ref 0.4–1.6)
EGFRCR SERPLBLD CKD-EPI 2021: 87 ML/MIN/1.73M*2
EJECTION FRACTION APICAL 4 CHAMBER: 56.4
EJECTION FRACTION: 58 %
ERYTHROCYTE [DISTWIDTH] IN BLOOD BY AUTOMATED COUNT: 12.6 % (ref 11.5–14.5)
GLUCOSE SERPL-MCNC: 92 MG/DL (ref 65–99)
HCT VFR BLD AUTO: 42.1 % (ref 41–52)
HGB BLD-MCNC: 14.6 G/DL (ref 13.5–17.5)
LEFT ATRIUM VOLUME AREA LENGTH INDEX BSA: 17.1 ML/M2
LEFT VENTRICLE INTERNAL DIMENSION DIASTOLE: 3.6 CM (ref 3.5–6)
LEFT VENTRICULAR OUTFLOW TRACT DIAMETER: 2.1 CM
MCH RBC QN AUTO: 31.1 PG (ref 26–34)
MCHC RBC AUTO-ENTMCNC: 34.7 G/DL (ref 32–36)
MCV RBC AUTO: 90 FL (ref 80–100)
MITRAL VALVE E/A RATIO: 0.88
NRBC BLD-RTO: 0 /100 WBCS (ref 0–0)
PLATELET # BLD AUTO: 162 X10*3/UL (ref 150–450)
POTASSIUM SERPL-SCNC: 4.2 MMOL/L (ref 3.4–5.1)
RBC # BLD AUTO: 4.7 X10*6/UL (ref 4.5–5.9)
RIGHT VENTRICLE FREE WALL PEAK S': 11.5 CM/S
SODIUM SERPL-SCNC: 138 MMOL/L (ref 133–145)
TRICUSPID ANNULAR PLANE SYSTOLIC EXCURSION: 2.4 CM
WBC # BLD AUTO: 6.4 X10*3/UL (ref 4.4–11.3)

## 2024-08-01 PROCEDURE — 94762 N-INVAS EAR/PLS OXIMTRY CONT: CPT

## 2024-08-01 PROCEDURE — 80048 BASIC METABOLIC PNL TOTAL CA: CPT | Performed by: STUDENT IN AN ORGANIZED HEALTH CARE EDUCATION/TRAINING PROGRAM

## 2024-08-01 PROCEDURE — G0378 HOSPITAL OBSERVATION PER HR: HCPCS

## 2024-08-01 PROCEDURE — 93306 TTE W/DOPPLER COMPLETE: CPT | Performed by: INTERNAL MEDICINE

## 2024-08-01 PROCEDURE — 2500000001 HC RX 250 WO HCPCS SELF ADMINISTERED DRUGS (ALT 637 FOR MEDICARE OP): Performed by: INTERNAL MEDICINE

## 2024-08-01 PROCEDURE — 36415 COLL VENOUS BLD VENIPUNCTURE: CPT | Performed by: STUDENT IN AN ORGANIZED HEALTH CARE EDUCATION/TRAINING PROGRAM

## 2024-08-01 PROCEDURE — 85027 COMPLETE CBC AUTOMATED: CPT | Performed by: STUDENT IN AN ORGANIZED HEALTH CARE EDUCATION/TRAINING PROGRAM

## 2024-08-01 PROCEDURE — 93306 TTE W/DOPPLER COMPLETE: CPT

## 2024-08-01 NOTE — SIGNIFICANT EVENT
Called patient and left voicemail that his echocardiogram was reviewed and normal.  I did leave him a message continue following with his primary care provider regarding appropriate blood pressure control.

## 2024-08-01 NOTE — DISCHARGE SUMMARY
Discharge Diagnosis  TIA (transient ischemic attack)    Issues Requiring Follow-Up  Primary care follow-up    Discharge Meds     Your medication list        CHANGE how you take these medications        Instructions Last Dose Given Next Dose Due   lisinopril 20 mg tablet  What changed:   medication strength  how much to take      Take 1 tablet (20 mg) by mouth once daily. Do not fill before August 1, 2024.                 Where to Get Your Medications        These medications were sent to [facility]  [address], [address], [place] [address]      Hours: 9 AM to 6 PM Mon-Fri, 9 AM to 1 PM Sat Phone: [phone]   lisinopril 20 mg tablet         Test Results Pending At Discharge  Pending Labs       No current pending labs.            Hospital Course   Patient mid to the hospital with right-sided weakness and tingling.  Patient symptoms improved when he got to the emergency room or shortly prior to.  In any case CT scan was done which was unremarkable, stroke consult was placed due to patient's NIH score with symptoms prior to ED.  Patient was admitted for further workup for TIA.  MRI/MRA were complete and unremarkable neurology did see patient yesterday and patient agreed to stay for his echocardiogram which was completed this morning.  I will follow-up on the results and alert the patient if anything needs to be done regarding this.  Otherwise patient's blood pressure was elevated on admission to ED and has been elevated throughout the day and from history may be somewhat not ideally controlled at home.  I did increase his lisinopril to 20 mg in hospital.  Patient was discharged home with DASH diet instructions and heart healthy diet, and information on controlling blood pressure through lifestyle.    Patient be discharged home in stable condition and improved condition.      Pertinent Physical Exam At Time of Discharge  Physical Exam  Generally no acute distress  HEENT PERRL EOMI  Cardiovascular  [place] S2 regular rhythm  Lungs clear to auscultation bilaterally  Abdomen bowel sounds present  Extremities no clubbing cyanosis edema  Neuro alert and oriented x 3, cranial nerves II through XII intact, motor and sensation intact in both upper lower extremity  Outpatient Follow-Up  No future appointments.  Total time spent on discharge 45 minutes    Richard Ang MD

## 2024-08-01 NOTE — PROGRESS NOTES
08/01/24 0903   Discharge Planning   Expected Discharge Disposition Home   Does the patient need discharge transport arranged? No     Home no needs.  DC written.

## 2024-08-02 ENCOUNTER — DOCUMENTATION (OUTPATIENT)
Dept: PRIMARY CARE | Facility: CLINIC | Age: 59
End: 2024-08-02
Payer: COMMERCIAL

## 2024-08-02 ENCOUNTER — PATIENT OUTREACH (OUTPATIENT)
Dept: PRIMARY CARE | Facility: CLINIC | Age: 59
End: 2024-08-02
Payer: COMMERCIAL

## 2024-08-02 LAB
ATRIAL RATE: 61 BPM
P AXIS: 30 DEGREES
P OFFSET: 176 MS
P ONSET: 126 MS
PR INTERVAL: 168 MS
Q ONSET: 210 MS
QRS COUNT: 10 BEATS
QRS DURATION: 94 MS
QT INTERVAL: 400 MS
QTC CALCULATION(BAZETT): 402 MS
QTC FREDERICIA: 402 MS
R AXIS: -19 DEGREES
T AXIS: 22 DEGREES
T OFFSET: 410 MS
VENTRICULAR RATE: 61 BPM

## 2024-08-02 NOTE — PROGRESS NOTES
Discharge Facility: [facility]  Discharge Diagnosis: TIA  Admission Date: 07/31/2024  Discharge Date: 08/01/2024    PCP Appointment Date: 08/09/2024, scheduled by this   Specialist Appointment Date: None  Hospital Encounter and Summary Linked: Yes    See discharge assessment below for further details      Medications  Medications reviewed with patient/caregiver?: Yes (8/2/2024 12:11 PM)  Is the patient having any side effects they believe may be caused by any medication additions or changes?: No (8/2/2024 12:11 PM)  Does the patient have all medications ordered at discharge?: Yes (8/2/2024 12:11 PM)  Prescription Comments: Script given at discharge for Lisinopril (8/2/2024 12:11 PM)  Is the patient taking all medications as directed (includes completed medication regime)?: Yes (8/2/2024 12:11 PM)  Medication Comments: Patient denies any issues obtaining or affording medication (8/2/2024 12:11 PM)    Appointments  Does the patient have a primary care provider?: Yes (8/2/2024 12:11 PM)  Care Management Interventions: Verified appointment date/time/provider (PCP 08/09/2024, scheduled by this ) (8/2/2024 12:11 PM)  Has the patient kept scheduled appointments due by today?: Yes (8/2/2024 12:11 PM)    Self Management  What is the home health agency?: N/A (8/2/2024 12:11 PM)  What Durable Medical Equipment (DME) was ordered?: N/A (8/2/2024 12:11 PM)    Patient Teaching  Does the patient have access to their discharge instructions?: Yes (8/2/2024 12:11 PM)  Care Management Interventions: Reviewed instructions with patient (8/2/2024 12:11 PM)  What is the patient's perception of their health status since discharge?: Improving (8/2/2024 12:11 PM)  Is the patient/caregiver able to teach back the hierarchy of who to call/visit for symptoms/problems? PCP, Specialist, Home Health nurse, Urgent Care, ED, 911: Yes (8/2/2024 12:11 PM)  Patient/Caregiver Education Comments: CM spoke to patient via phone. He states that he is doing  well at home. He has his discharge medication at the home. PCP follow up appointment has been scheduled by this CM. He has no questions at this time. (8/2/2024 12:11 PM)

## 2024-08-09 ENCOUNTER — OFFICE VISIT (OUTPATIENT)
Dept: PRIMARY CARE | Facility: CLINIC | Age: 59
End: 2024-08-09
Payer: COMMERCIAL

## 2024-08-09 VITALS
HEIGHT: 75 IN | SYSTOLIC BLOOD PRESSURE: 118 MMHG | RESPIRATION RATE: 16 BRPM | BODY MASS INDEX: 22.38 KG/M2 | WEIGHT: 180 LBS | DIASTOLIC BLOOD PRESSURE: 82 MMHG | HEART RATE: 88 BPM | OXYGEN SATURATION: 98 % | TEMPERATURE: 97.4 F

## 2024-08-09 DIAGNOSIS — I10 PRIMARY HYPERTENSION: Primary | ICD-10-CM

## 2024-08-09 DIAGNOSIS — G45.9 TIA (TRANSIENT ISCHEMIC ATTACK): ICD-10-CM

## 2024-08-09 PROCEDURE — 3008F BODY MASS INDEX DOCD: CPT | Performed by: FAMILY MEDICINE

## 2024-08-09 PROCEDURE — 3074F SYST BP LT 130 MM HG: CPT | Performed by: FAMILY MEDICINE

## 2024-08-09 PROCEDURE — 1036F TOBACCO NON-USER: CPT | Performed by: FAMILY MEDICINE

## 2024-08-09 PROCEDURE — 3079F DIAST BP 80-89 MM HG: CPT | Performed by: FAMILY MEDICINE

## 2024-08-09 PROCEDURE — 99495 TRANSJ CARE MGMT MOD F2F 14D: CPT | Performed by: FAMILY MEDICINE

## 2024-08-09 ASSESSMENT — PATIENT HEALTH QUESTIONNAIRE - PHQ9
2. FEELING DOWN, DEPRESSED OR HOPELESS: NOT AT ALL
SUM OF ALL RESPONSES TO PHQ9 QUESTIONS 1 AND 2: 0
1. LITTLE INTEREST OR PLEASURE IN DOING THINGS: NOT AT ALL

## 2024-08-09 ASSESSMENT — ENCOUNTER SYMPTOMS
RESPIRATORY NEGATIVE: 1
CONSTITUTIONAL NEGATIVE: 1
NEUROLOGICAL NEGATIVE: 1
CARDIOVASCULAR NEGATIVE: 1
LOSS OF SENSATION IN FEET: 0
GASTROINTESTINAL NEGATIVE: 1
OCCASIONAL FEELINGS OF UNSTEADINESS: 0
MUSCULOSKELETAL NEGATIVE: 1
DEPRESSION: 0

## 2024-08-09 ASSESSMENT — COLUMBIA-SUICIDE SEVERITY RATING SCALE - C-SSRS
2. HAVE YOU ACTUALLY HAD ANY THOUGHTS OF KILLING YOURSELF?: NO
6. HAVE YOU EVER DONE ANYTHING, STARTED TO DO ANYTHING, OR PREPARED TO DO ANYTHING TO END YOUR LIFE?: NO
1. IN THE PAST MONTH, HAVE YOU WISHED YOU WERE DEAD OR WISHED YOU COULD GO TO SLEEP AND NOT WAKE UP?: NO

## 2024-08-09 ASSESSMENT — PAIN SCALES - GENERAL: PAINLEVEL: 0-NO PAIN

## 2024-08-09 NOTE — PROGRESS NOTES
"Subjective   Patient ID: Mike Hung is a 59 y.o. male who presents for Hospital Follow-up (Pt is here for TCM hospital follow up of right side weakness. ).    HPI admitted 7/31 -8/1 after haing right sided weakness/tingling and had resolved by the time he arrived at ER.  CT/MRI/MA/ECHO wnl.  Lisinopril was increased to 20 mg due to bp not optimally controlled.  Bps have 120s/80s at home.    No further symptoms.  He exercises regularly.  His wt has been stable.  Prior coronary calcium score of 0  Review of Systems   Constitutional: Negative.    HENT: Negative.     Respiratory: Negative.     Cardiovascular: Negative.    Gastrointestinal: Negative.    Genitourinary: Negative.    Musculoskeletal: Negative.    Neurological: Negative.        Objective   /82 (BP Location: Left arm, Patient Position: Sitting, BP Cuff Size: Adult)   Pulse 88   Temp 36.3 °C (97.4 °F) (Temporal)   Resp 16   Ht 1.905 m (6' 3\")   Wt 81.6 kg (180 lb)   SpO2 98%   BMI 22.50 kg/m²     Physical Exam  Constitutional:       General: He is not in acute distress.     Appearance: Normal appearance.   Cardiovascular:      Rate and Rhythm: Normal rate and regular rhythm.      Heart sounds: No murmur heard.  Pulmonary:      Breath sounds: Normal breath sounds. No wheezing.   Neurological:      Mental Status: He is alert.         Assessment/Plan   Problem List Items Addressed This Visit             ICD-10-CM    RESOLVED: TIA (transient ischemic attack) G45.9    Hypertension - Primary I10   He will monitor bp going forward and start 81 mg asa for now.  Follow up if any recurrent symptoms.         "

## 2024-08-15 ENCOUNTER — PATIENT OUTREACH (OUTPATIENT)
Dept: PRIMARY CARE | Facility: CLINIC | Age: 59
End: 2024-08-15
Payer: COMMERCIAL

## 2024-08-29 ENCOUNTER — PHARMACY VISIT (OUTPATIENT)
Dept: PHARMACY | Facility: CLINIC | Age: 59
End: 2024-08-29

## 2024-08-29 PROCEDURE — RXMED WILLOW AMBULATORY MEDICATION CHARGE

## 2024-09-10 ENCOUNTER — PATIENT OUTREACH (OUTPATIENT)
Dept: PRIMARY CARE | Facility: CLINIC | Age: 59
End: 2024-09-10
Payer: COMMERCIAL

## 2024-09-23 DIAGNOSIS — I10 PRIMARY HYPERTENSION: ICD-10-CM

## 2024-09-23 RX ORDER — LISINOPRIL 20 MG/1
20 TABLET ORAL DAILY
Qty: 90 TABLET | Refills: 1 | Status: SHIPPED | OUTPATIENT
Start: 2024-09-23 | End: 2025-03-22

## 2024-10-17 ENCOUNTER — PATIENT OUTREACH (OUTPATIENT)
Dept: PRIMARY CARE | Facility: CLINIC | Age: 59
End: 2024-10-17
Payer: COMMERCIAL

## 2025-02-06 ENCOUNTER — OFFICE VISIT (OUTPATIENT)
Dept: PRIMARY CARE | Facility: CLINIC | Age: 60
End: 2025-02-06
Payer: COMMERCIAL

## 2025-02-06 VITALS
WEIGHT: 175 LBS | BODY MASS INDEX: 21.76 KG/M2 | TEMPERATURE: 97.7 F | RESPIRATION RATE: 20 BRPM | SYSTOLIC BLOOD PRESSURE: 128 MMHG | DIASTOLIC BLOOD PRESSURE: 88 MMHG | HEIGHT: 75 IN

## 2025-02-06 DIAGNOSIS — J01.00 ACUTE NON-RECURRENT MAXILLARY SINUSITIS: Primary | ICD-10-CM

## 2025-02-06 PROCEDURE — 3079F DIAST BP 80-89 MM HG: CPT | Performed by: FAMILY MEDICINE

## 2025-02-06 PROCEDURE — 3074F SYST BP LT 130 MM HG: CPT | Performed by: FAMILY MEDICINE

## 2025-02-06 PROCEDURE — 1036F TOBACCO NON-USER: CPT | Performed by: FAMILY MEDICINE

## 2025-02-06 PROCEDURE — 3008F BODY MASS INDEX DOCD: CPT | Performed by: FAMILY MEDICINE

## 2025-02-06 PROCEDURE — 99213 OFFICE O/P EST LOW 20 MIN: CPT | Performed by: FAMILY MEDICINE

## 2025-02-06 RX ORDER — METHYLPREDNISOLONE 4 MG/1
TABLET ORAL
Qty: 21 TABLET | Refills: 0 | Status: SHIPPED | OUTPATIENT
Start: 2025-02-06

## 2025-02-06 RX ORDER — AMOXICILLIN AND CLAVULANATE POTASSIUM 875; 125 MG/1; MG/1
875 TABLET, FILM COATED ORAL 2 TIMES DAILY
Qty: 20 TABLET | Refills: 0 | Status: SHIPPED | OUTPATIENT
Start: 2025-02-06 | End: 2025-02-16

## 2025-02-06 ASSESSMENT — PATIENT HEALTH QUESTIONNAIRE - PHQ9
1. LITTLE INTEREST OR PLEASURE IN DOING THINGS: NOT AT ALL
2. FEELING DOWN, DEPRESSED OR HOPELESS: NOT AT ALL
SUM OF ALL RESPONSES TO PHQ9 QUESTIONS 1 AND 2: 0

## 2025-02-06 ASSESSMENT — ENCOUNTER SYMPTOMS
OCCASIONAL FEELINGS OF UNSTEADINESS: 0
LOSS OF SENSATION IN FEET: 0
DEPRESSION: 0

## 2025-02-06 ASSESSMENT — PAIN SCALES - GENERAL: PAINLEVEL_OUTOF10: 0-NO PAIN

## 2025-02-06 NOTE — PROGRESS NOTES
"Subjective   Patient ID: Mike Hung is a 59 y.o. male who presents for Sinusitis (PATIENT COMPLAINS OF SINUS PRESSURE AND DRAINAGE).    Sinusitis     for about 2 weeks he has had left sided sinus pressure  Ear discomfort.  He has been using nasal saline.  No chest pain/sob/fever.   He is flying to [place] in 1 week for a college visit with his daughter.   Review of Systems see HPI    Objective   /88 (BP Location: Right arm, Patient Position: Sitting, BP Cuff Size: Adult)   Temp 36.5 °C (97.7 °F) (Skin)   Resp 20   Ht 1.905 m (6' 3\")   Wt 79.4 kg (175 lb)   BMI 21.87 kg/m²     Physical Exam  Constitutional:       General: He is not in acute distress.     Appearance: Normal appearance.   HENT:      Right Ear: Tympanic membrane and ear canal normal.      Left Ear: Tympanic membrane and ear canal normal.      Nose: Congestion present.      Mouth/Throat:      Pharynx: No oropharyngeal exudate or posterior oropharyngeal erythema.   Eyes:      General:         Right eye: No discharge.         Left eye: No discharge.      Conjunctiva/sclera: Conjunctivae normal.      Pupils: Pupils are equal, round, and reactive to light.   Cardiovascular:      Rate and Rhythm: Normal rate and regular rhythm.      Heart sounds: No murmur heard.  Pulmonary:      Breath sounds: Normal breath sounds. No wheezing.   Neurological:      Mental Status: He is alert.         Assessment/Plan   Problem List Items Addressed This Visit    None  Visit Diagnoses         Codes    Acute non-recurrent maxillary sinusitis    -  Primary J01.00    Relevant Medications    methylPREDNISolone (Medrol Dospak) 4 mg tablets    amoxicillin-pot clavulanate (Augmentin) 875-125 mg tablet        Flonase otc and call 1 week if not resolving.        "

## 2025-03-30 DIAGNOSIS — I10 PRIMARY HYPERTENSION: ICD-10-CM

## 2025-03-31 RX ORDER — LISINOPRIL 20 MG/1
20 TABLET ORAL DAILY
Qty: 90 TABLET | Refills: 2 | Status: SHIPPED | OUTPATIENT
Start: 2025-03-31

## 2025-08-07 ENCOUNTER — TELEPHONE (OUTPATIENT)
Dept: PRIMARY CARE | Facility: CLINIC | Age: 60
End: 2025-08-07
Payer: COMMERCIAL

## 2025-08-07 DIAGNOSIS — Z00.00 ROUTINE GENERAL MEDICAL EXAMINATION AT A HEALTH CARE FACILITY: ICD-10-CM

## 2025-08-07 DIAGNOSIS — Z12.5 SCREENING FOR PROSTATE CANCER: ICD-10-CM

## 2025-08-13 LAB
CHOLEST SERPL-MCNC: 174 MG/DL
CHOLEST/HDLC SERPL: 5.1 (CALC)
ERYTHROCYTE [DISTWIDTH] IN BLOOD BY AUTOMATED COUNT: 13 % (ref 11–15)
HCT VFR BLD AUTO: 45.5 % (ref 38.5–50)
HCV AB SERPL QL IA: NORMAL
HDLC SERPL-MCNC: 34 MG/DL
HGB BLD-MCNC: 15.4 G/DL (ref 13.2–17.1)
HIV 1+2 AB+HIV1 P24 AG SERPL QL IA: NORMAL
HIV 1+2 AB+HIV1 P24 AG SERPL QL IA: NORMAL
LDLC SERPL CALC-MCNC: ABNORMAL MG/DL
MCH RBC QN AUTO: 31.4 PG (ref 27–33)
MCHC RBC AUTO-ENTMCNC: 33.8 G/DL (ref 32–36)
MCV RBC AUTO: 92.9 FL (ref 80–100)
NONHDLC SERPL-MCNC: 140 MG/DL (CALC)
PLATELET # BLD AUTO: 177 THOUSAND/UL (ref 140–400)
PMV BLD REES-ECKER: 12.8 FL (ref 7.5–12.5)
PSA SERPL-MCNC: 0.24 NG/ML
RBC # BLD AUTO: 4.9 MILLION/UL (ref 4.2–5.8)
TRIGL SERPL-MCNC: 424 MG/DL
TSH SERPL-ACNC: 1.58 MIU/L (ref 0.4–4.5)
WBC # BLD AUTO: 5.3 THOUSAND/UL (ref 3.8–10.8)

## 2025-08-18 LAB
CHOLEST SERPL-MCNC: 174 MG/DL
CHOLEST/HDLC SERPL: 5.1 (CALC)
ERYTHROCYTE [DISTWIDTH] IN BLOOD BY AUTOMATED COUNT: 13 % (ref 11–15)
HCT VFR BLD AUTO: 45.5 % (ref 38.5–50)
HCV AB SERPL QL IA: NORMAL
HDLC SERPL-MCNC: 34 MG/DL
HGB BLD-MCNC: 15.4 G/DL (ref 13.2–17.1)
HIV 1+2 AB+HIV1 P24 AG SERPL QL IA: NORMAL
HIV 1+2 AB+HIV1 P24 AG SERPL QL IA: NORMAL
LDLC SERPL CALC-MCNC: ABNORMAL MG/DL
LDLC SERPL DIRECT ASSAY-MCNC: 85 MG/DL
MCH RBC QN AUTO: 31.4 PG (ref 27–33)
MCHC RBC AUTO-ENTMCNC: 33.8 G/DL (ref 32–36)
MCV RBC AUTO: 92.9 FL (ref 80–100)
NONHDLC SERPL-MCNC: 140 MG/DL (CALC)
PLATELET # BLD AUTO: 177 THOUSAND/UL (ref 140–400)
PMV BLD REES-ECKER: 12.8 FL (ref 7.5–12.5)
PSA SERPL-MCNC: 0.24 NG/ML
RBC # BLD AUTO: 4.9 MILLION/UL (ref 4.2–5.8)
TRIGL SERPL-MCNC: 424 MG/DL
TSH SERPL-ACNC: 1.58 MIU/L (ref 0.4–4.5)
WBC # BLD AUTO: 5.3 THOUSAND/UL (ref 3.8–10.8)

## 2025-08-27 ASSESSMENT — PROMIS GLOBAL HEALTH SCALE
RATE_AVERAGE_FATIGUE: MILD
RATE_AVERAGE_PAIN: 2
EMOTIONAL_PROBLEMS: NEVER
RATE_QUALITY_OF_LIFE: VERY GOOD
CARRYOUT_PHYSICAL_ACTIVITIES: COMPLETELY
RATE_PHYSICAL_HEALTH: VERY GOOD
RATE_GENERAL_HEALTH: VERY GOOD
RATE_MENTAL_HEALTH: VERY GOOD
CARRYOUT_SOCIAL_ACTIVITIES: GOOD
RATE_SOCIAL_SATISFACTION: GOOD

## 2025-08-28 ENCOUNTER — OFFICE VISIT (OUTPATIENT)
Dept: PRIMARY CARE | Facility: CLINIC | Age: 60
End: 2025-08-28
Payer: COMMERCIAL

## 2025-08-28 ASSESSMENT — ENCOUNTER SYMPTOMS
GASTROINTESTINAL NEGATIVE: 1
NEUROLOGICAL NEGATIVE: 1
LOSS OF SENSATION IN FEET: 0
MUSCULOSKELETAL NEGATIVE: 1
CARDIOVASCULAR NEGATIVE: 1
CONSTITUTIONAL NEGATIVE: 1
DEPRESSION: 0
RESPIRATORY NEGATIVE: 1
OCCASIONAL FEELINGS OF UNSTEADINESS: 0

## 2025-08-28 ASSESSMENT — PAIN SCALES - GENERAL: PAINLEVEL_OUTOF10: 0-NO PAIN

## 2025-08-28 ASSESSMENT — PATIENT HEALTH QUESTIONNAIRE - PHQ9
SUM OF ALL RESPONSES TO PHQ9 QUESTIONS 1 AND 2: 0
1. LITTLE INTEREST OR PLEASURE IN DOING THINGS: NOT AT ALL
2. FEELING DOWN, DEPRESSED OR HOPELESS: NOT AT ALL